# Patient Record
Sex: MALE | Race: WHITE | Employment: OTHER | URBAN - METROPOLITAN AREA
[De-identification: names, ages, dates, MRNs, and addresses within clinical notes are randomized per-mention and may not be internally consistent; named-entity substitution may affect disease eponyms.]

---

## 2017-10-25 ENCOUNTER — OFFICE VISIT (OUTPATIENT)
Dept: ORTHOPEDIC SURGERY | Age: 77
End: 2017-10-25

## 2017-10-25 VITALS — HEIGHT: 72 IN | BODY MASS INDEX: 33.86 KG/M2 | WEIGHT: 250 LBS

## 2017-10-25 DIAGNOSIS — M65.30 ACQUIRED TRIGGER FINGER: ICD-10-CM

## 2017-10-25 DIAGNOSIS — M79.644 FINGER PAIN, RIGHT: Primary | ICD-10-CM

## 2017-10-25 PROCEDURE — G8427 DOCREV CUR MEDS BY ELIG CLIN: HCPCS | Performed by: ORTHOPAEDIC SURGERY

## 2017-10-25 PROCEDURE — G8484 FLU IMMUNIZE NO ADMIN: HCPCS | Performed by: ORTHOPAEDIC SURGERY

## 2017-10-25 PROCEDURE — 4004F PT TOBACCO SCREEN RCVD TLK: CPT | Performed by: ORTHOPAEDIC SURGERY

## 2017-10-25 PROCEDURE — 4040F PNEUMOC VAC/ADMIN/RCVD: CPT | Performed by: ORTHOPAEDIC SURGERY

## 2017-10-25 PROCEDURE — G8417 CALC BMI ABV UP PARAM F/U: HCPCS | Performed by: ORTHOPAEDIC SURGERY

## 2017-10-25 PROCEDURE — 20550 NJX 1 TENDON SHEATH/LIGAMENT: CPT | Performed by: ORTHOPAEDIC SURGERY

## 2017-10-25 PROCEDURE — 1123F ACP DISCUSS/DSCN MKR DOCD: CPT | Performed by: ORTHOPAEDIC SURGERY

## 2017-10-25 PROCEDURE — 99203 OFFICE O/P NEW LOW 30 MIN: CPT | Performed by: ORTHOPAEDIC SURGERY

## 2017-10-25 RX ORDER — ALLOPURINOL 300 MG/1
TABLET ORAL
COMMUNITY
Start: 2017-05-30

## 2017-10-25 RX ORDER — ASPIRIN 81 MG/1
81 TABLET, CHEWABLE ORAL
COMMUNITY
Start: 2012-12-18

## 2017-10-25 RX ORDER — DUTASTERIDE 0.5 MG/1
CAPSULE, LIQUID FILLED ORAL
COMMUNITY
Start: 2017-08-13

## 2017-10-25 RX ORDER — DOXAZOSIN 8 MG/1
8 TABLET ORAL
COMMUNITY
Start: 2017-08-16

## 2017-10-25 RX ORDER — LISINOPRIL 40 MG/1
TABLET ORAL
COMMUNITY
Start: 2017-09-03

## 2017-10-25 NOTE — PROGRESS NOTES
Assessment: Fairly severe right ring trigger finger and moderate right small trigger finger. Treatment Plan: With the severity of his triggering we are going to try injections into both ring and small fingers today but we've discussed risks and benefits of trigger finger release including the incidence of scar tissue tenderness the slight potential risk of digital nerve injury. If he does not respond to the injections he is going to call and can be scheduled for surgery. Return for post op. History of Present Illness  Rafael Bob is a 76 y.o. male. Patient is a friend of mine who has about a 1-2 month history of fairly severe locking of his ring and small fingers on the right hand. The ring finger probably started even prior to this but for the past 2 months is been locking every time he flexes down to a full fist.  He is nondiabetic had no injury to this area and has not really changed activity levels. On the ring finger he actually has to take the other hand and pull it to get it open    Review of Systems  Complete Review of Systems performed and is non-contributory except for what is noted in HPI. Vital Signs  Vitals:    10/25/17 1158   Weight: 250 lb (113.4 kg)   Height: 6' (1.829 m)     Body mass index is 33.91 kg/m². Physical Exam  Constitutional:  Patient is well-nourished and demonstrates normal hygiene. Mental Status:  Patient is alert and oriented to person, place and time. Skin:  Intact, no rashes or lesions. Hand Examination: He has definite palpable tenderness over the A1 pulley with mass effect on the ring finger and small fingers. Definite palpable triggering on full flexion. Vascular exam shows normal capillary refill. Neurologic exam negative Tinel's and Phalen's.     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  Additional Diagnostic Test Findings:    Office Procedures: #1 after discussing the procedure with the patient I injected the right ring trigger finger with 1 mL betamethasone 1/2 mL Xylocaine under sterile technique. #2 after discussing the procedure with the patient I also injected the right small finger with 1 mL betamethasone 1/2 mL Xylocaine under sterile technique.     Orders Placed This Encounter   Procedures    XR HAND RIGHT (MIN 3 VIEWS)     02856     Order Specific Question:   Reason for exam:     Answer:   Hand Pain    DC INJECT TENDON SHEATH/LIGAMENT    DC BETAMETHASONE ACET&SOD PHOSP

## 2018-02-05 ENCOUNTER — OFFICE VISIT (OUTPATIENT)
Dept: ORTHOPEDIC SURGERY | Age: 78
End: 2018-02-05

## 2018-02-05 VITALS — BODY MASS INDEX: 33.86 KG/M2 | HEIGHT: 72 IN | WEIGHT: 250 LBS

## 2018-02-05 DIAGNOSIS — M65.341 TRIGGER FINGER, RIGHT RING FINGER: Primary | ICD-10-CM

## 2018-02-05 DIAGNOSIS — M65.351 TRIGGER FINGER, RIGHT LITTLE FINGER: ICD-10-CM

## 2018-02-05 PROCEDURE — 99213 OFFICE O/P EST LOW 20 MIN: CPT | Performed by: ORTHOPAEDIC SURGERY

## 2018-02-05 PROCEDURE — 4040F PNEUMOC VAC/ADMIN/RCVD: CPT | Performed by: ORTHOPAEDIC SURGERY

## 2018-02-05 PROCEDURE — G8484 FLU IMMUNIZE NO ADMIN: HCPCS | Performed by: ORTHOPAEDIC SURGERY

## 2018-02-05 PROCEDURE — G8417 CALC BMI ABV UP PARAM F/U: HCPCS | Performed by: ORTHOPAEDIC SURGERY

## 2018-02-05 PROCEDURE — 1123F ACP DISCUSS/DSCN MKR DOCD: CPT | Performed by: ORTHOPAEDIC SURGERY

## 2018-02-05 PROCEDURE — 4004F PT TOBACCO SCREEN RCVD TLK: CPT | Performed by: ORTHOPAEDIC SURGERY

## 2018-02-05 PROCEDURE — G8427 DOCREV CUR MEDS BY ELIG CLIN: HCPCS | Performed by: ORTHOPAEDIC SURGERY

## 2018-02-08 ENCOUNTER — TELEPHONE (OUTPATIENT)
Dept: ORTHOPEDIC SURGERY | Age: 78
End: 2018-02-08

## 2018-02-26 RX ORDER — LIDOCAINE HYDROCHLORIDE 10 MG/ML
1 INJECTION, SOLUTION EPIDURAL; INFILTRATION; INTRACAUDAL; PERINEURAL
Status: CANCELLED | OUTPATIENT
Start: 2018-02-26 | End: 2018-02-26

## 2018-02-26 RX ORDER — SODIUM CHLORIDE, SODIUM LACTATE, POTASSIUM CHLORIDE, CALCIUM CHLORIDE 600; 310; 30; 20 MG/100ML; MG/100ML; MG/100ML; MG/100ML
INJECTION, SOLUTION INTRAVENOUS CONTINUOUS
Status: CANCELLED | OUTPATIENT
Start: 2018-02-26

## 2018-02-26 RX ORDER — SODIUM CHLORIDE 0.9 % (FLUSH) 0.9 %
10 SYRINGE (ML) INJECTION PRN
Status: CANCELLED | OUTPATIENT
Start: 2018-02-26

## 2018-02-26 RX ORDER — SODIUM CHLORIDE 0.9 % (FLUSH) 0.9 %
10 SYRINGE (ML) INJECTION EVERY 12 HOURS SCHEDULED
Status: CANCELLED | OUTPATIENT
Start: 2018-02-26

## 2018-02-27 ENCOUNTER — HOSPITAL ENCOUNTER (OUTPATIENT)
Dept: SURGERY | Age: 78
Discharge: HOME | End: 2018-02-27
Attending: ORTHOPAEDIC SURGERY | Admitting: ORTHOPAEDIC SURGERY

## 2018-02-27 VITALS
HEART RATE: 58 BPM | HEIGHT: 72 IN | OXYGEN SATURATION: 96 % | TEMPERATURE: 97 F | DIASTOLIC BLOOD PRESSURE: 85 MMHG | SYSTOLIC BLOOD PRESSURE: 174 MMHG | RESPIRATION RATE: 18 BRPM | BODY MASS INDEX: 34.54 KG/M2 | WEIGHT: 255 LBS

## 2018-02-27 DIAGNOSIS — M65.341 TRIGGER RING FINGER OF RIGHT HAND: ICD-10-CM

## 2018-02-27 ASSESSMENT — PAIN SCALES - GENERAL: PAINLEVEL_OUTOF10: 0

## 2018-02-27 ASSESSMENT — PAIN - FUNCTIONAL ASSESSMENT: PAIN_FUNCTIONAL_ASSESSMENT: 0-10

## 2018-02-27 NOTE — OP NOTE
Operative Report  Patient:  Akshat Munson  Medical Record #: [id number]  Date: 2/27/2018  Surgeon: Vargas Lagunas M.D. PREOPERATIVE DIAGNOSIS:  1.  right hand Ring Finger finger, trigger finger  2. Right small trigger finger    POSTOPERATIVE DIAGNOSIS:  1.  right Hand Ring Finger finger, trigger finger. 2. Right small trigger finger    PROCEDURE PERFORMED:  1.  right Ring Finger trigger finger release. 2 right small trigger finger release ( separate incision)    SURGEON:  James Lagunas M.D. ANESTHESIA:  Local    COMPLICATIONS:  None    DETAILS OF PROCEDURE:  The patient was placed on the operating table in the supine position and local anesthesia was placed over the A-1 pulleys of the  ring and small fingers. The arm was then scrubbed with ChloraPrep and draped in a sterile fashion. The arm was exsanguinated using an Ace bandage, and a well padded tourniquet was inflated about the upper arm to 250 mmHg. incisions were then made over the A-1 pulleys. Dissection was carried down through the palmar aponeurosis to expose the A-1 pulley. Care was taken to identify and avoid the neurovascular bundles during the dissection. The A-1 pulley was then completely divided in its entirety and the anterior surface of the pulley was excised. The patient was then asked to flex and extend the digit, and there was no significant residual triggering. The tourniquet was deflated. Hemostasis was achieved using electrocautery and the wound was closed with 5-0 nylon vertical mattress sutures. The patient tolerated the operative procedure well and was placed in a dry sterile dressing with polysporin and adaptic.

## 2018-03-07 ENCOUNTER — OFFICE VISIT (OUTPATIENT)
Dept: ORTHOPEDIC SURGERY | Age: 78
End: 2018-03-07

## 2018-03-07 VITALS — BODY MASS INDEX: 34.55 KG/M2 | WEIGHT: 255.07 LBS | HEIGHT: 72 IN

## 2018-03-07 DIAGNOSIS — M65.351 TRIGGER FINGER, RIGHT LITTLE FINGER: Primary | ICD-10-CM

## 2018-03-07 DIAGNOSIS — M65.341 TRIGGER FINGER, RIGHT RING FINGER: ICD-10-CM

## 2018-03-07 PROCEDURE — 99024 POSTOP FOLLOW-UP VISIT: CPT | Performed by: ORTHOPAEDIC SURGERY
